# Patient Record
Sex: MALE | Race: WHITE | Employment: OTHER | URBAN - METROPOLITAN AREA
[De-identification: names, ages, dates, MRNs, and addresses within clinical notes are randomized per-mention and may not be internally consistent; named-entity substitution may affect disease eponyms.]

---

## 2024-05-08 ENCOUNTER — HOSPITAL ENCOUNTER (OUTPATIENT)
Dept: OCCUPATIONAL THERAPY | Age: 59
Setting detail: THERAPIES SERIES
Discharge: HOME | End: 2024-05-08
Payer: MEDICARE

## 2024-05-08 PROCEDURE — 97165 OT EVAL LOW COMPLEX 30 MIN: CPT | Performed by: OCCUPATIONAL THERAPIST

## 2024-05-08 NOTE — PROGRESS NOTES
10.5cm       Mandibular angle - inner eye 12.5cm       Mandibular angle - outer eye 10cm       Chin to inner eye 9.75cm                 left         Tragus-chin 16cm       Tragus mouth 11.5cm       Mandibular angle - chin 12.5cm       Mandibular angle - nose 10cm       Mandibular angle - inner eye 12.5cm       Mandibular angle - outer eye 10cm       Chin to inner eye 9.5cm                                 Vision: patient notes decreased vision since treatment began        Hearing: within functional limits for daily life tasks     ADL  Feeding:  Independent.  Patient on liquid diet at this time secondary to poor mouth opening  Grooming:  independent  Bathing:  independent  UE Dressing:  independent  LE Dressing:  independent  Toileting:  independent  Transfer:  independent    UE ASSESSMENT: Hand Dominance is right handed  ROM within functional limits for daily life tasks  Strength within functional limits for daily life tasks    Sensation: within functional limits for light touch with exception of right side of head/neck increased numbness tingling    OT G-codes:  Carrying, Handling, Moving objects   (Current status): CK   (Discharge Goal):  CH  Lymphedema Life Impact Scale Score:  30  Percent Impairment:  40%      Intervention: 05322, 32382, 72979, 01731     Eval Complexity: Low Complexity      Rehab Potential:                                 [x] Good  [] Fair  [] Poor        Suggested Professional Referral:       [x] No  [] Yes:  Barriers to Goal Achievement::          [x] No  [] Yes:  Domestic Concerns:                           [x] No  [] Yes:       Patient. Education:  [x] Plans/Goals, Risks/Benefits discussed  [] Home exercise program  Method of Education: [x] Verbal  [] Demo  [] Written  Comprehension of Education:  [x] Verbalizes understanding.  [] Demonstrates understanding.  [x] Needs Review.  [] Demonstrates/verbalizes understanding of HEP/Ed previously given.        Patient understands

## 2024-05-13 ENCOUNTER — HOSPITAL ENCOUNTER (OUTPATIENT)
Dept: OCCUPATIONAL THERAPY | Age: 59
Setting detail: THERAPIES SERIES
Discharge: HOME | End: 2024-05-13
Payer: MEDICARE

## 2024-05-13 PROCEDURE — 97535 SELF CARE MNGMENT TRAINING: CPT | Performed by: OCCUPATIONAL THERAPIST

## 2024-05-13 PROCEDURE — 97140 MANUAL THERAPY 1/> REGIONS: CPT | Performed by: OCCUPATIONAL THERAPIST

## 2024-05-13 NOTE — PROGRESS NOTES
Occupational Therapy        OCCUPATIONAL THERAPY PROGRESS NOTE  [facility] [place]  [address] [facility]  [address]              Phone: [phone]             Fax: [phone]     Date:  2024  Initial Evaluation Date: 2204     Evaluating Therapist: LUPE Fuentes/L, CLT-ARSLAN    Patient Name:  Bharathi Scales    :  1965    Restrictions/Precautions:   fall risk  Diagnosis:  Oropharynx cancer (C10.9)            Date of Surgery/Injury: n/a    Insurance/Certification information:   Medicare Part A and B                        [id number]   Plan of care signed (Y/N): N  Visit# / total visits: Evaluation + Visit #1    Referring Practitioner:  Amber Allen MD   Specific Practitioner Orders: Evaluation and Treatment    Assessment of current deficits   []Pain  []Skin Integrity   [x]Lymphedema   []Functional transfers/mobility   []ADLs   []Strength    []Cognition  []IADLs   []Safety Awareness   []  Motor Endurance    []Fine Motor Coordination   []Balance   []Vision/perception  []Sensation []Gross Motor Coordination  [x]ROM     OT PLAN OF CARE   OT POC based on physician orders, patient diagnosis and results of clinical assessment    Frequency/Duration:  1-2x per week for 12 treatment sessions from 2024 through 2024   Specific OT Treatment to include:     Plan of Care:     [x]97140-Manual Lymph Drainage and Combined Decongestive Therapy  [x]74229- Skilled Multilayer Short Stretch Compression Bandaging/ Therapeutic Exercise  [x]Skin Care Education [x]HEP including Self MLD Education and/or Self Bandaging  [x]Education for Lymphedema Risks/Precautions     [x] Caregiver Education   []other:      Patient Specific Goal: to be able to eat      STG: 3 weeks   Patient will demonstrate knowledge and understanding for lymphedema precautions, skin care and self management to decrease progression of lymphedema and risk of infection.  Therapist initiated patient

## 2024-05-16 ENCOUNTER — HOSPITAL ENCOUNTER (OUTPATIENT)
Dept: OCCUPATIONAL THERAPY | Age: 59
Setting detail: THERAPIES SERIES
Discharge: HOME | End: 2024-05-16
Payer: MEDICARE

## 2024-05-16 PROCEDURE — 97535 SELF CARE MNGMENT TRAINING: CPT | Performed by: OCCUPATIONAL THERAPIST

## 2024-05-16 PROCEDURE — 97140 MANUAL THERAPY 1/> REGIONS: CPT | Performed by: OCCUPATIONAL THERAPIST

## 2024-05-16 NOTE — PROGRESS NOTES
Occupational Therapy        OCCUPATIONAL THERAPY PROGRESS NOTE  [facility]  [address] [facility]  [address]              Phone: [phone]             Fax: [phone]     Date:  2024  Initial Evaluation Date: 2204     Evaluating Therapist: LUPE Fuentes/L, CLT-ARSLAN    Patient Name:  Bharathi Scales    :  1965    Restrictions/Precautions:   fall risk  Diagnosis:  Oropharynx cancer (C10.9)            Date of Surgery/Injury: n/a    Insurance/Certification information:   Medicare Part A and B                        [id number]   Plan of care signed (Y/N): N  Visit# / total visits: Evaluation + Visit #2    Referring Practitioner:  Amber Allen MD   Specific Practitioner Orders: Evaluation and Treatment    Assessment of current deficits   []Pain  []Skin Integrity   [x]Lymphedema   []Functional transfers/mobility   []ADLs   []Strength    []Cognition  []IADLs   []Safety Awareness   []  Motor Endurance    []Fine Motor Coordination   []Balance   []Vision/perception  []Sensation []Gross Motor Coordination  [x]ROM     OT PLAN OF CARE   OT POC based on physician orders, patient diagnosis and results of clinical assessment    Frequency/Duration:  1-2x per week for 12 treatment sessions from 2024 through 2024   Specific OT Treatment to include:     Plan of Care:     [x]97140-Manual Lymph Drainage and Combined Decongestive Therapy  [x]90502- Skilled Multilayer Short Stretch Compression Bandaging/ Therapeutic Exercise  [x]Skin Care Education [x]HEP including Self MLD Education and/or Self Bandaging  [x]Education for Lymphedema Risks/Precautions     [x] Caregiver Education   []other:      Patient Specific Goal: to be able to eat      STG: 3 weeks   Patient will demonstrate knowledge and understanding for lymphedema precautions, skin care and self management to decrease progression of lymphedema and risk of infection.  Therapist continued patient

## 2024-05-20 ENCOUNTER — HOSPITAL ENCOUNTER (OUTPATIENT)
Dept: OCCUPATIONAL THERAPY | Age: 59
Setting detail: THERAPIES SERIES
Discharge: HOME | End: 2024-05-20
Payer: MEDICARE

## 2024-05-20 PROCEDURE — 97140 MANUAL THERAPY 1/> REGIONS: CPT | Performed by: OCCUPATIONAL THERAPIST

## 2024-05-20 PROCEDURE — 97535 SELF CARE MNGMENT TRAINING: CPT | Performed by: OCCUPATIONAL THERAPIST

## 2024-05-20 NOTE — PROGRESS NOTES
Occupational Therapy        OCCUPATIONAL THERAPY PROGRESS NOTE  [facility]  [address] [facility]  [address]              Phone: [phone]             Fax: [phone]     Date:  2024  Initial Evaluation Date: 2204     Evaluating Therapist: LUPE Fuentes/L, CLT-ARSLAN    Patient Name:  Bharathi Scales    :  1965    Restrictions/Precautions:   fall risk  Diagnosis:  Oropharynx cancer (C10.9)            Date of Surgery/Injury: n/a    Insurance/Certification information:   Medicare Part A and B                        [id number]   Plan of care signed (Y/N): N  Visit# / total visits: Evaluation + Visit #3    Referring Practitioner:  Amber Allen MD   Specific Practitioner Orders: Evaluation and Treatment    Assessment of current deficits   []Pain  []Skin Integrity   [x]Lymphedema   []Functional transfers/mobility   []ADLs   []Strength    []Cognition  []IADLs   []Safety Awareness   []  Motor Endurance    []Fine Motor Coordination   []Balance   []Vision/perception  []Sensation []Gross Motor Coordination  [x]ROM     OT PLAN OF CARE   OT POC based on physician orders, patient diagnosis and results of clinical assessment    Frequency/Duration:  1-2x per week for 12 treatment sessions from 2024 through 2024   Specific OT Treatment to include:     Plan of Care:     [x]97140-Manual Lymph Drainage and Combined Decongestive Therapy  [x]23261- Skilled Multilayer Short Stretch Compression Bandaging/ Therapeutic Exercise  [x]Skin Care Education [x]HEP including Self MLD Education and/or Self Bandaging  [x]Education for Lymphedema Risks/Precautions     [x] Caregiver Education   []other:      Patient Specific Goal: to be able to eat      STG: 3 weeks   Patient will demonstrate knowledge and understanding for lymphedema precautions, skin care and self management to decrease progression of lymphedema and risk of infection.  Therapist continued patient

## 2024-05-24 ENCOUNTER — HOSPITAL ENCOUNTER (OUTPATIENT)
Dept: OCCUPATIONAL THERAPY | Age: 59
Setting detail: THERAPIES SERIES
Discharge: HOME | End: 2024-05-24
Payer: MEDICARE

## 2024-05-24 PROCEDURE — 97535 SELF CARE MNGMENT TRAINING: CPT | Performed by: OCCUPATIONAL THERAPIST

## 2024-05-24 PROCEDURE — 97140 MANUAL THERAPY 1/> REGIONS: CPT | Performed by: OCCUPATIONAL THERAPIST

## 2024-05-24 NOTE — PROGRESS NOTES
maximize mouth opening to increase ability to eat and drink.        Restrictions/Precautions:  [] No blood pressure/blood draw in: [] Left Upper Extremity     [] Right Upper Extremity        [x] Fall Risk       Intervention:   []Other    Pain:  [] No    [] Yes  Location:  Pain Rating (0-10 pain scale):  Pain Description:  Interruption of Treatment [] Yes  [] No    Came to Clinic:  [] Bandaged    []Unbandaged    [] With Stocking     [] With Sleeve     [] Kinesiotaped    [] Unna Boot    [] With Alternative Compression:    Skin Integrity:  [] Normal [] Dry  []Rough []Moist []Rash  Location of problem area/s:  [] Wound: Location/Description   [] Fibrosis: Location/Description  [] Keratosis: Location/Description  [] Papillomas: Location/Description  [] Other    Color:  [] Normal [] Mottled [] Flushed [] Other/Description  Location of problem area/s:    Edema:  Edema noted right side of head/neck    Subjective:  patient attended treatment session alone.  Patient with continued jaw pain.  Discomfort better at conclusion of treatment session.    Objective:  [] Measurement [x]Manual Lymph Drainage  [x]Bandaging   []Kinesiotaping [x] Education    []Self Massage   []Self Bandaging [] Exercise    []Wound Care  []Hygiene  [] Other        Assessment:  Knowledge of home program:   [] Good [] Fair  [] Poor  Patient is programming at home:             [] Yes  [] No  Family is assisting with programming: [] Yes  [] No  Home programming is consistent:             [] Yes  [] No  Other:   Response to treatment:  good  Instructions for patient:   [x] Verbal [x] Written  Instructions addressed:  manual lymphatic drainage massage for head/neck involvement        Time In: 1000            Time Out: 1055                     Timed Code Treatment Minutes: 55 minutes      CODE  Minutes  Units   64639 OT Eval Low     42545 OT Eval Medium     82808 OT Eval High     07317 Fluidotherapy     17479 Manual 45 3   97654 Therapeutic Ex     02584

## 2024-05-30 ENCOUNTER — HOSPITAL ENCOUNTER (OUTPATIENT)
Dept: OCCUPATIONAL THERAPY | Age: 59
Setting detail: THERAPIES SERIES
Discharge: HOME | End: 2024-05-30
Payer: MEDICARE

## 2024-05-30 PROCEDURE — 97140 MANUAL THERAPY 1/> REGIONS: CPT | Performed by: OCCUPATIONAL THERAPIST

## 2024-05-30 PROCEDURE — 97535 SELF CARE MNGMENT TRAINING: CPT | Performed by: OCCUPATIONAL THERAPIST

## 2024-05-30 NOTE — PROGRESS NOTES
education regarding lymphedema precautions and skin care / self management.  Patient progressing toward goal.        2.  Patient will present with decreased limb volume in the involved extremity from mild to trace for improved functional mobility.   Therapist continued patient education regarding self manual lymphatic drainage massage sequence.  Therapist reviewed online video and written sequence.  Patient continues to perform at home twice a day and notes some improvement.  Patient stated he continues to have pain through jaw extending into neck area.  Patient describes as a pressure that improves slightly with manual drainage.  Therapist performed manual lymphatic drainage massage sequence for head/neck involvement.  Therapist described each step as it was performed.  Patient able to verbalize understanding.  Patient noted differences once completed.  Patient educated to continue sequence into area above jaw to maximize management.  Patient able to verbalize undersatnding.  Therapist to continue next treatment session.  (Total:  45min)    3.  Patient will demonstrate compliance with compression therapy for independent management of lymphedema.  Therapist continued patient education regarding compression garments for head/neck.  Patient continues to utilize  hours of sleep without issue.  Patient stated she gest some relief with use.  Therapist to continue to monitor.  Patient progressing toward goal.  (Total:  15min)    4.  Patient will increase trismus by 1cm to maximize mouth opening to increase ability to eat and drink.     Therapist continued patient education regarding jaw range of motion exercises.  Patient stated he performs several times per day.  Patient stated he has increased pain with exercises and does not stretch as therapist does.  Therapist provided support and encouragement asking patient to continue exercises with increased stretching to maximize results.  Patient able to verbalize

## 2024-06-20 ENCOUNTER — HOSPITAL ENCOUNTER (OUTPATIENT)
Dept: OCCUPATIONAL THERAPY | Age: 59
Setting detail: THERAPIES SERIES
Discharge: HOME | End: 2024-06-20
Payer: MEDICARE

## 2024-06-20 PROCEDURE — 97140 MANUAL THERAPY 1/> REGIONS: CPT | Performed by: OCCUPATIONAL THERAPIST

## 2024-06-20 PROCEDURE — 97530 THERAPEUTIC ACTIVITIES: CPT | Performed by: OCCUPATIONAL THERAPIST

## 2024-06-20 NOTE — PROGRESS NOTES
(0-10 pain scale):  Pain Description:  Interruption of Treatment [] Yes  [] No    Came to Clinic:  [] Bandaged    []Unbandaged    [] With Stocking     [] With Sleeve     [] Kinesiotaped    [] Unna Boot    [] With Alternative Compression:    Skin Integrity:  [] Normal [] Dry  []Rough []Moist []Rash  Location of problem area/s:  [] Wound: Location/Description   [] Fibrosis: Location/Description  [] Keratosis: Location/Description  [] Papillomas: Location/Description  [] Other    Color:  [] Normal [] Mottled [] Flushed [] Other/Description  Location of problem area/s:    Edema:  Edema noted right side of head/neck    Subjective:  patient attended treatment session alone.  Patient received referral for speech therapy from [facility].  Speech therapy dept. Has received and will be scheduling as able.    Objective:  [] Measurement [x]Manual Lymph Drainage  [x]Bandaging   []Kinesiotaping [x] Education    []Self Massage   []Self Bandaging [] Exercise    []Wound Care  []Hygiene  [] Other        Assessment:  Knowledge of home program:   [] Good [] Fair  [] Poor  Patient is programming at home:             [] Yes  [] No  Family is assisting with programming: [] Yes  [] No  Home programming is consistent:             [] Yes  [] No  Other:   Response to treatment:  good  Instructions for patient:   [x] Verbal [x] Written  Instructions addressed:  manual lymphatic drainage massage for head/neck involvement        Time In: 1000            Time Out: 1100                     Timed Code Treatment Minutes: 60 minutes      CODE  Minutes  Units   46450 OT Eval Low     68517 OT Eval Medium     35843 OT Eval High     90410 Fluidotherapy     50425 Manual 45 3   65379 Therapeutic Ex     72930 Therapeutic Activity 15 1   46618 ADL/COMP Tech Train     14147 Neuromuscular Re-Ed     26076 OrthoManagementTraining     25750 Paraffin     92584 Electrical Stim - Attended     07987 Iontophoresis     04346 Ultrasound      Other     Total  60 4       Plan:

## 2024-06-27 ENCOUNTER — HOSPITAL ENCOUNTER (OUTPATIENT)
Dept: OCCUPATIONAL THERAPY | Age: 59
Setting detail: THERAPIES SERIES
Discharge: HOME | End: 2024-06-27
Payer: MEDICARE

## 2024-06-27 PROCEDURE — 97140 MANUAL THERAPY 1/> REGIONS: CPT | Performed by: OCCUPATIONAL THERAPIST

## 2024-06-27 NOTE — PROGRESS NOTES
Occupational Therapy        OCCUPATIONAL THERAPY PROGRESS NOTE  [facility]  [address] [facility]  [address]              Phone: [phone]             Fax: [phone]     Date:  2024  Initial Evaluation Date: 2204     Evaluating Therapist: LUPE Fuentes/L, CLT-ARSLAN    Patient Name:  Bharathi Scales    :  1965    Restrictions/Precautions:   fall risk  Diagnosis:  Oropharynx cancer (C10.9)            Date of Surgery/Injury: n/a    Insurance/Certification information:   Medicare Part A and B                        [id number]   Plan of care signed (Y/N): N  Visit# / total visits: Evaluation + Visit #7    Referring Practitioner:  Amber Allen MD   Specific Practitioner Orders: Evaluation and Treatment    Assessment of current deficits   []Pain  []Skin Integrity   [x]Lymphedema   []Functional transfers/mobility   []ADLs   []Strength    []Cognition  []IADLs   []Safety Awareness   []  Motor Endurance    []Fine Motor Coordination   []Balance   []Vision/perception  []Sensation []Gross Motor Coordination  [x]ROM     OT PLAN OF CARE   OT POC based on physician orders, patient diagnosis and results of clinical assessment    Frequency/Duration:  1-2x per week for 12 treatment sessions from 2024 through 2024   Specific OT Treatment to include:     Plan of Care:     [x]97140-Manual Lymph Drainage and Combined Decongestive Therapy  [x]00519- Skilled Multilayer Short Stretch Compression Bandaging/ Therapeutic Exercise  [x]Skin Care Education [x]HEP including Self MLD Education and/or Self Bandaging  [x]Education for Lymphedema Risks/Precautions     [x] Caregiver Education   []other:      Patient Specific Goal: to be able to eat      STG: 3 weeks   Patient will demonstrate knowledge and understanding for lymphedema precautions, skin care and self management to decrease progression of lymphedema and risk of infection.  Therapist continued patient

## 2024-07-03 ENCOUNTER — HOSPITAL ENCOUNTER (OUTPATIENT)
Dept: OCCUPATIONAL THERAPY | Age: 59
Setting detail: THERAPIES SERIES
Discharge: HOME | End: 2024-07-03

## 2024-07-03 NOTE — PROGRESS NOTES
Occupational Therapy          [facility]  [facility]  [address] [facility] [address]  Dept: [phone]  Loc: [phone]   [facility] OT Fax: [phone]    Cancellation/No Show Note      Date:  7/3/2024    Patient Name:  Bharathi Scales     :  1965         PT ID: [id number]    Total missed visits including today: 0       Total number of no shows: 0    For today's appointment patient:   [x]  Cancelled   []  Rescheduled appointment   []  No-show     Reason given by patient:   []  Patient ill   []  Conflicting appointment   []  No transportation   []  Conflict with work   []  No reason given   [x]  Other:    Comments:                    Comments:  patient attended scheduled lymphedema treatment this afternoon.  Patient explained to therapist that he was found to have an infection and began taking medication two days prior.  Therapist explained to patient he cannot perform any hand manual lymphatic drainage if patient has an infection.  Therapist further explained to patient that he will be able to continue treatment after four to five days of taking his medication.  Patient further has increased pain through left side of jaw extending into neck area.  Patient prefers not to have therapist do any stretch or manual manipulation.  Therapist in agreement.  Therapist scheduled patient for next appointment.      Electronically signed by:  LUPE Fuentes/L, CLT-ARSLAN

## 2024-07-12 ENCOUNTER — HOSPITAL ENCOUNTER (OUTPATIENT)
Dept: OCCUPATIONAL THERAPY | Age: 59
Setting detail: THERAPIES SERIES
Discharge: HOME | End: 2024-07-12

## 2024-07-12 NOTE — PROGRESS NOTES
Occupational Therapy          [facility]  [facility]  [address] [facility] [address]  Dept: [phone]  Loc: [phone]   [facility] OT Fax: [phone]    Cancellation/No Show Note      Date:  2024    Patient Name:  Bharathi Scales     :  1965         PT ID: [id number]    Total missed visits including today: 0       Total number of no shows: 0    For today's appointment patient:   []  Cancelled   []  Rescheduled appointment   [x]  No-show     Reason given by patient:   []  Patient ill   []  Conflicting appointment   []  No transportation   []  Conflict with work   []  No reason given   []  Other:    Comments:                    Comments:  patient no call no show for scheduled lymphedema clinic appointment.  Patient missed last treatment session secondary to infection and therapist will reschedule patient appointment if he calls regarding missed appointment.      Electronically signed by:  LUPE Fuentes/L, CLT-ARSLAN

## 2025-01-21 ENCOUNTER — HOSPITAL ENCOUNTER (OUTPATIENT)
Dept: OCCUPATIONAL THERAPY | Age: 60
Setting detail: THERAPIES SERIES
Discharge: HOME | End: 2025-01-21

## 2025-01-21 NOTE — DISCHARGE SUMMARY
garment    Restrictions/Precautions:  [] No blood pressure/blood draw in: [] Left Upper Extremity     [] Right Upper Extremity        [x] Fall Risk       Intervention:   []Other    Subjective:  patient no show no call for last scheduled appointment and did not call to reschedule.      Rehab Potential: [] Excellent [] Good [x] Fair  [] Poor     Goal Status:  [] Achieved [x] Partially Achieved  [] Not Achieved     Patient Status: [x] Patient now discharged             Electronically signed by: Serg Currie, OTR/LNAOMI      Physician's Certification / Comments         I have reviewed the Plan of Care established for skilled therapy services and certify that the services are required and that they will be provided while the patient is under my care.     Physician's Comments/Revisions:                   Physicians's Printed Name:  Amber Allen MD                                    Physician's Signature:                                                               Date:      Please review Patient's OT evaluation and if you agree sign/date and fax back to us at our [facility] OT Fax: [phone]. Thank you for your referral!